# Patient Record
Sex: MALE | URBAN - METROPOLITAN AREA
[De-identification: names, ages, dates, MRNs, and addresses within clinical notes are randomized per-mention and may not be internally consistent; named-entity substitution may affect disease eponyms.]

---

## 2021-01-29 ENCOUNTER — APPOINTMENT (RX ONLY)
Dept: URBAN - METROPOLITAN AREA CLINIC 98 | Facility: CLINIC | Age: 56
Setting detail: DERMATOLOGY
End: 2021-01-29

## 2021-01-29 DIAGNOSIS — L08.9 LOCAL INFECTION OF THE SKIN AND SUBCUTANEOUS TISSUE, UNSPECIFIED: ICD-10-CM

## 2021-01-29 DIAGNOSIS — L91.0 HYPERTROPHIC SCAR: ICD-10-CM

## 2021-01-29 DIAGNOSIS — L21.8 OTHER SEBORRHEIC DERMATITIS: ICD-10-CM

## 2021-01-29 DIAGNOSIS — Z00.8 ENCOUNTER FOR OTHER GENERAL EXAMINATION: ICD-10-CM

## 2021-01-29 PROCEDURE — 99072 ADDL SUPL MATRL&STAF TM PHE: CPT

## 2021-01-29 PROCEDURE — ? PRESCRIPTION

## 2021-01-29 PROCEDURE — ? PHE RELATED SUPPLIES PROVIDED/DISPENSED

## 2021-01-29 PROCEDURE — ? COUNSELING

## 2021-01-29 PROCEDURE — ? TREATMENT REGIMEN

## 2021-01-29 PROCEDURE — 99203 OFFICE O/P NEW LOW 30 MIN: CPT

## 2021-01-29 RX ORDER — KETOCONAZOLE 20 MG/ML
SHAMPOO, SUSPENSION TOPICAL
Qty: 1 | Refills: 0 | Status: ERX | COMMUNITY
Start: 2021-01-29

## 2021-01-29 RX ORDER — SULFAMETHOXAZOLE AND TRIMETHOPRIM 800; 160 MG/1; MG/1
TABLET ORAL BID
Qty: 28 | Refills: 0 | Status: ERX | COMMUNITY
Start: 2021-01-29

## 2021-01-29 RX ORDER — GENTAMICIN SULFATE 1 MG/G
OINTMENT TOPICAL
Qty: 1 | Refills: 0 | Status: ERX | COMMUNITY
Start: 2021-01-29

## 2021-01-29 RX ADMIN — SULFAMETHOXAZOLE AND TRIMETHOPRIM: 800; 160 TABLET ORAL at 00:00

## 2021-01-29 RX ADMIN — KETOCONAZOLE: 20 SHAMPOO, SUSPENSION TOPICAL at 00:00

## 2021-01-29 RX ADMIN — GENTAMICIN SULFATE: 1 OINTMENT TOPICAL at 00:00

## 2021-01-29 ASSESSMENT — LOCATION DETAILED DESCRIPTION DERM
LOCATION DETAILED: RIGHT SUPERIOR POSTERIOR NECK
LOCATION DETAILED: LEFT SUPERIOR POSTERIOR NECK
LOCATION DETAILED: LEFT SUPERIOR LATERAL NECK
LOCATION DETAILED: RIGHT SUPERIOR LATERAL NECK
LOCATION DETAILED: LEFT INFERIOR LATERAL MALAR CHEEK

## 2021-01-29 ASSESSMENT — LOCATION SIMPLE DESCRIPTION DERM
LOCATION SIMPLE: LEFT CHEEK
LOCATION SIMPLE: POSTERIOR NECK
LOCATION SIMPLE: NECK

## 2021-01-29 ASSESSMENT — LOCATION ZONE DERM
LOCATION ZONE: FACE
LOCATION ZONE: NECK

## 2021-01-29 ASSESSMENT — SCAR ASSESSEMENT OVERALL: SCAR ASSESSMENT: 3.5 (NODULAR SCAR, ERYTHEMA)

## 2021-01-29 NOTE — HPI: SCAR (KELOIDS)
Is This A New Presentation, Or A Follow-Up?: Scars
Additional History: Patient states he has had lesions treated in the past with ILK  and unknown topical cream. Lesions were surgical removed 10 years ago and came back 1 month after removal.

## 2021-01-29 NOTE — PROCEDURE: TREATMENT REGIMEN
Plan: Recommended patient to see plastic surgeon to have keloids further evaluated or removed by a plastic surgeon after skin infection is resolved. Patient was provided with multiple treatment centers ( Dr. Meza, [facility] and [facility]
Detail Level: Simple

## 2021-02-08 ENCOUNTER — APPOINTMENT (RX ONLY)
Dept: URBAN - METROPOLITAN AREA CLINIC 98 | Facility: CLINIC | Age: 56
Setting detail: DERMATOLOGY
End: 2021-02-08

## 2021-02-08 DIAGNOSIS — L08.9 LOCAL INFECTION OF THE SKIN AND SUBCUTANEOUS TISSUE, UNSPECIFIED: ICD-10-CM | Status: IMPROVED

## 2021-02-08 DIAGNOSIS — Z00.8 ENCOUNTER FOR OTHER GENERAL EXAMINATION: ICD-10-CM

## 2021-02-08 DIAGNOSIS — L85.3 XEROSIS CUTIS: ICD-10-CM

## 2021-02-08 PROCEDURE — ? PHE RELATED SUPPLIES PROVIDED/DISPENSED

## 2021-02-08 PROCEDURE — 99213 OFFICE O/P EST LOW 20 MIN: CPT

## 2021-02-08 PROCEDURE — 99072 ADDL SUPL MATRL&STAF TM PHE: CPT

## 2021-02-08 PROCEDURE — ? PRESCRIPTION

## 2021-02-08 PROCEDURE — ? COUNSELING

## 2021-02-08 RX ORDER — GENTAMICIN SULFATE 1 MG/G
OINTMENT TOPICAL
Qty: 1 | Refills: 0 | Status: ERX

## 2021-02-08 RX ORDER — SULFAMETHOXAZOLE AND TRIMETHOPRIM 800; 160 MG/1; MG/1
TABLET ORAL BID
Qty: 28 | Refills: 0 | Status: ERX

## 2021-02-08 ASSESSMENT — LOCATION DETAILED DESCRIPTION DERM: LOCATION DETAILED: LEFT SUPERIOR POSTERIOR NECK

## 2021-02-08 ASSESSMENT — LOCATION ZONE DERM: LOCATION ZONE: NECK

## 2021-02-08 ASSESSMENT — LOCATION SIMPLE DESCRIPTION DERM: LOCATION SIMPLE: NECK

## 2021-03-01 ENCOUNTER — APPOINTMENT (RX ONLY)
Dept: URBAN - METROPOLITAN AREA CLINIC 98 | Facility: CLINIC | Age: 56
Setting detail: DERMATOLOGY
End: 2021-03-01

## 2021-03-01 DIAGNOSIS — Z00.8 ENCOUNTER FOR OTHER GENERAL EXAMINATION: ICD-10-CM

## 2021-03-01 DIAGNOSIS — L91.0 HYPERTROPHIC SCAR: ICD-10-CM

## 2021-03-01 DIAGNOSIS — L08.9 LOCAL INFECTION OF THE SKIN AND SUBCUTANEOUS TISSUE, UNSPECIFIED: ICD-10-CM | Status: RESOLVED

## 2021-03-01 DIAGNOSIS — L85.3 XEROSIS CUTIS: ICD-10-CM

## 2021-03-01 PROCEDURE — ? PHE RELATED SUPPLIES PROVIDED/DISPENSED

## 2021-03-01 PROCEDURE — ? COUNSELING

## 2021-03-01 PROCEDURE — 99072 ADDL SUPL MATRL&STAF TM PHE: CPT

## 2021-03-01 PROCEDURE — ? TREATMENT REGIMEN

## 2021-03-01 PROCEDURE — 99213 OFFICE O/P EST LOW 20 MIN: CPT

## 2021-03-01 ASSESSMENT — LOCATION DETAILED DESCRIPTION DERM
LOCATION DETAILED: LEFT SUPERIOR POSTERIOR NECK
LOCATION DETAILED: RIGHT SUPERIOR LATERAL NECK
LOCATION DETAILED: RIGHT SUPERIOR POSTERIOR NECK
LOCATION DETAILED: LEFT SUPERIOR LATERAL NECK

## 2021-03-01 ASSESSMENT — SCAR ASSESSEMENT OVERALL: SCAR ASSESSMENT: 3.5 (NODULAR SCAR, ERYTHEMA)

## 2021-03-01 ASSESSMENT — LOCATION SIMPLE DESCRIPTION DERM
LOCATION SIMPLE: NECK
LOCATION SIMPLE: POSTERIOR NECK

## 2021-03-01 ASSESSMENT — LOCATION ZONE DERM: LOCATION ZONE: NECK

## 2021-03-01 NOTE — PROCEDURE: TREATMENT REGIMEN
Detail Level: Simple
Plan: Recommended patient to see plastic surgeon to have keloids further evaluated, removed by a general surgeon, or [facility]. Patient was provided with multiple treatment centers ( Dr. Meza, [facility] and [facility]in the firsts visit. Today Patient was provided information for [facility]/laser for keloids